# Patient Record
Sex: FEMALE
[De-identification: names, ages, dates, MRNs, and addresses within clinical notes are randomized per-mention and may not be internally consistent; named-entity substitution may affect disease eponyms.]

---

## 2023-05-14 ENCOUNTER — NON-APPOINTMENT (OUTPATIENT)
Age: 68
End: 2023-05-14

## 2023-05-16 PROBLEM — Z00.00 ENCOUNTER FOR PREVENTIVE HEALTH EXAMINATION: Status: ACTIVE | Noted: 2023-05-16

## 2024-02-23 ENCOUNTER — NON-APPOINTMENT (OUTPATIENT)
Age: 69
End: 2024-02-23

## 2024-08-09 ENCOUNTER — APPOINTMENT (OUTPATIENT)
Dept: ORTHOPEDIC SURGERY | Facility: CLINIC | Age: 69
End: 2024-08-09

## 2024-08-09 PROBLEM — F32.A DEPRESSION: Status: ACTIVE | Noted: 2024-08-09

## 2024-08-09 PROBLEM — M54.50 LUMBAR PAIN: Status: ACTIVE | Noted: 2024-08-09

## 2024-08-09 PROBLEM — M51.36 LUMBAR DEGENERATIVE DISC DISEASE: Status: ACTIVE | Noted: 2024-08-09

## 2024-08-09 PROBLEM — E78.00 HIGH CHOLESTEROL: Status: ACTIVE | Noted: 2024-08-09

## 2024-08-09 PROCEDURE — 72110 X-RAY EXAM L-2 SPINE 4/>VWS: CPT

## 2024-08-09 PROCEDURE — 72170 X-RAY EXAM OF PELVIS: CPT

## 2024-08-09 NOTE — HISTORY OF PRESENT ILLNESS
[de-identified] : Marv Murguia is a 68-year-old female, PMHx HLD, Osteoporosis, GERD, presenting with lower back pain 6 months, no reported injury.  denies any radicular symptoms, No bowel/urination issues. symptoms just not getting better. Pain indicated to midline lumbar region, 5/10, intermittent, achy in nature. Worsening certain body positions. Symptoms does not awake patient from sleep.  Some relief with rest, walking around, nsiads.    [] : no

## 2024-08-09 NOTE — IMAGING
[de-identified] : [facility] Inspection: No rash or ecchymosis Palpation: No tenderness to palpation or spasm in bilateral thoracic and lumbar paraspinal musculature, no SI joint tenderness to palpation ROM: Full with no pain Strength: 5/5 bilateral hip flexors, knee extensors, ankle dorsiflexors, EHL, ankle plantarflexors Sensation: Sensation present to light touch bilateral L2-S1 distributions Provocative maneuvers: Negative bilateral straight leg raise

## 2024-08-09 NOTE — ASSESSMENT
[FreeTextEntry1] : Patient following up for improved focal lumbar back pain. Patient educated on signs and symptoms of lumbar degeneration, worsened L2-L3. at this time wants to start conservative measures of Nsaids, physical therapy. will follow up as needed. recommended 2 months.   NSAIDs prn- Patient warned of risk of medication to GI tract, increased blood pressure, cardiac risk, and risk of fluid retention.  Advised to clear medication with internist or PCP if any concurrent health problem with heart, blood pressure, or GI system exists.

## 2024-10-11 ENCOUNTER — APPOINTMENT (OUTPATIENT)
Dept: ORTHOPEDIC SURGERY | Facility: CLINIC | Age: 69
End: 2024-10-11
Payer: MEDICARE

## 2024-10-11 DIAGNOSIS — M54.50 LOW BACK PAIN, UNSPECIFIED: ICD-10-CM

## 2024-10-11 DIAGNOSIS — M51.369: ICD-10-CM

## 2024-10-11 PROCEDURE — 99204 OFFICE O/P NEW MOD 45 MIN: CPT
